# Patient Record
Sex: FEMALE | Race: OTHER
[De-identification: names, ages, dates, MRNs, and addresses within clinical notes are randomized per-mention and may not be internally consistent; named-entity substitution may affect disease eponyms.]

---

## 2021-04-11 ENCOUNTER — HOSPITAL ENCOUNTER (EMERGENCY)
Dept: HOSPITAL 61 - ER | Age: 61
Discharge: HOME | End: 2021-04-11
Payer: COMMERCIAL

## 2021-04-11 VITALS — HEIGHT: 62 IN | WEIGHT: 170.64 LBS | BODY MASS INDEX: 31.4 KG/M2

## 2021-04-11 VITALS — SYSTOLIC BLOOD PRESSURE: 118 MMHG | DIASTOLIC BLOOD PRESSURE: 70 MMHG

## 2021-04-11 DIAGNOSIS — I10: ICD-10-CM

## 2021-04-11 DIAGNOSIS — R10.32: ICD-10-CM

## 2021-04-11 DIAGNOSIS — Z98.890: ICD-10-CM

## 2021-04-11 DIAGNOSIS — M54.42: Primary | ICD-10-CM

## 2021-04-11 LAB
ALBUMIN SERPL-MCNC: 4.1 G/DL (ref 3.4–5)
ALBUMIN/GLOB SERPL: 1.1 {RATIO} (ref 1–1.7)
ALP SERPL-CCNC: 105 U/L (ref 46–116)
ALT SERPL-CCNC: 29 U/L (ref 14–59)
ANION GAP SERPL CALC-SCNC: 15 MMOL/L (ref 6–14)
APTT PPP: YELLOW S
AST SERPL-CCNC: 16 U/L (ref 15–37)
BACTERIA #/AREA URNS HPF: (no result) /HPF
BASOPHILS # BLD AUTO: 0 X10^3/UL (ref 0–0.2)
BASOPHILS NFR BLD: 1 % (ref 0–3)
BILIRUB SERPL-MCNC: 0.3 MG/DL (ref 0.2–1)
BILIRUB UR QL STRIP: NEGATIVE
BUN SERPL-MCNC: 13 MG/DL (ref 7–20)
BUN/CREAT SERPL: 19 (ref 6–20)
CALCIUM SERPL-MCNC: 9.1 MG/DL (ref 8.5–10.1)
CHLORIDE SERPL-SCNC: 100 MMOL/L (ref 98–107)
CO2 SERPL-SCNC: 24 MMOL/L (ref 21–32)
CREAT SERPL-MCNC: 0.7 MG/DL (ref 0.6–1)
EOSINOPHIL NFR BLD: 0.1 X10^3/UL (ref 0–0.7)
EOSINOPHIL NFR BLD: 1 % (ref 0–3)
ERYTHROCYTE [DISTWIDTH] IN BLOOD BY AUTOMATED COUNT: 12.4 % (ref 11.5–14.5)
FIBRINOGEN PPP-MCNC: CLEAR MG/DL
GFR SERPLBLD BASED ON 1.73 SQ M-ARVRAT: 85.6 ML/MIN
GLUCOSE SERPL-MCNC: 102 MG/DL (ref 70–99)
HCT VFR BLD CALC: 43.9 % (ref 36–47)
HGB BLD-MCNC: 15.2 G/DL (ref 12–15.5)
LIPASE: 130 U/L (ref 73–393)
LYMPHOCYTES # BLD: 2 X10^3/UL (ref 1–4.8)
LYMPHOCYTES NFR BLD AUTO: 31 % (ref 24–48)
MCH RBC QN AUTO: 32 PG (ref 25–35)
MCHC RBC AUTO-ENTMCNC: 35 G/DL (ref 31–37)
MCV RBC AUTO: 91 FL (ref 79–100)
MONO #: 0.4 X10^3/UL (ref 0–1.1)
MONOCYTES NFR BLD: 7 % (ref 0–9)
NEUT #: 3.8 X10^3/UL (ref 1.8–7.7)
NEUTROPHILS NFR BLD AUTO: 61 % (ref 31–73)
NITRITE UR QL STRIP: NEGATIVE
PH UR STRIP: 7 [PH]
PLATELET # BLD AUTO: 228 X10^3/UL (ref 140–400)
POTASSIUM SERPL-SCNC: 4.4 MMOL/L (ref 3.5–5.1)
PROT SERPL-MCNC: 7.8 G/DL (ref 6.4–8.2)
PROT UR STRIP-MCNC: NEGATIVE MG/DL
RBC # BLD AUTO: 4.81 X10^6/UL (ref 3.5–5.4)
RBC #/AREA URNS HPF: 0 /HPF (ref 0–2)
SODIUM SERPL-SCNC: 139 MMOL/L (ref 136–145)
UROBILINOGEN UR-MCNC: 0.2 MG/DL
WBC # BLD AUTO: 6.4 X10^3/UL (ref 4–11)
WBC #/AREA URNS HPF: (no result) /HPF (ref 0–4)

## 2021-04-11 PROCEDURE — 83690 ASSAY OF LIPASE: CPT

## 2021-04-11 PROCEDURE — 96361 HYDRATE IV INFUSION ADD-ON: CPT

## 2021-04-11 PROCEDURE — 85025 COMPLETE CBC W/AUTO DIFF WBC: CPT

## 2021-04-11 PROCEDURE — 99284 EMERGENCY DEPT VISIT MOD MDM: CPT

## 2021-04-11 PROCEDURE — 80053 COMPREHEN METABOLIC PANEL: CPT

## 2021-04-11 PROCEDURE — 36415 COLL VENOUS BLD VENIPUNCTURE: CPT

## 2021-04-11 PROCEDURE — 74176 CT ABD & PELVIS W/O CONTRAST: CPT

## 2021-04-11 PROCEDURE — 81001 URINALYSIS AUTO W/SCOPE: CPT

## 2021-04-11 PROCEDURE — 96374 THER/PROPH/DIAG INJ IV PUSH: CPT

## 2021-04-11 NOTE — PHYS DOC
Past Medical History


Past Medical History:  Hypertension


Past Surgical History:  


Smoking Status:  Never Smoker


Alcohol Use:  None


Drug Use:  None





General Adult


EDM:


Chief Complaint:  GROIN PAIN





HPI:


HPI:





Patient is a 60  year old female who presents with as of Tuesday she started 

having left lower groin sharp pain that was worse with sitting up and walking or

bending over.  She states she is also having slight left lower back pain.  

Patient at this time is laying flat and states that she has no pain.  She states

that she saw her doctor on Friday and they put her on Bactrim antibiotic for 

possible urinary tract infection.  She states that she does have a history of 

hypertension and kidney stones.  She denies fever, nausea,, chest pain, 

shortness of breath vomiting, diarrhea, constipation, abnormal vaginal discharge

or bleeding, focal weakness, numbness or tingling, loss of bowel bladder, 

headache, dizziness.  She states that she did start taking the Bactrim on 

Friday.





Review of Systems:


Review of Systems:


Constitutional:   Denies fever or chills. []


Eyes:   Denies change in visual acuity. []


HENT:   Denies nasal congestion or sore throat. [] 


Respiratory:   Denies cough or shortness of breath. [] 


Cardiovascular:   Denies chest pain or edema. [] 


GI:  + Left lower groin abdominal pain, denies nausea, vomiting, bloody stools 

or diarrhea. [] 


:  Denies dysuria. [] 


Musculoskeletal:   +Left lower back pain or denies joint pain. [] 


Integument:   Denies rash. [] 


Neurologic:   Denies headache, focal weakness or sensory changes. [] 


Endocrine:   Denies polyuria or polydipsia. [] 


Lymphatic:  Denies swollen glands. [] 


Psychiatric:  Denies depression or anxiety. []





Heart Score:


C/O Chest Pain:  No


Risk Factors:


Risk Factors:  DM, Current or recent (<one month) smoker, HTN, HLP, family 

history of CAD, obesity.


Risk Scores:


Score 0 - 3:  2.5% MACE over next 6 weeks - Discharge Home


Score 4 - 6:  20.3% MACE over next 6 weeks - Admit for Clinical Observation


Score 7 - 10:  72.7% MACE over next 6 weeks - Early Invasive Strategies





Allergies:


Allergies:





Allergies








Coded Allergies Type Severity Reaction Last Updated Verified


 


  No Known Drug Allergies    5/20/15 No











Physical Exam:


PE:





Constitutional: Well developed, well nourished, no acute distress, non-toxic 

appearance. []


HENT: Normocephalic, atraumatic, bilateral external ears normal, oropharynx 

moist, no oral exudates, nose normal. []


Eyes: PERRLA, EOMI, conjunctiva normal, no discharge. [] 


Neck: Normal range of motion, no tenderness, supple, no stridor. [] 


Cardiovascular:Heart rate regular rhythm, no murmur []


Lungs & Thorax:  Bilateral breath sounds clear to auscultation []


Abdomen: Bowel sounds normal, soft, no tenderness, no masses, no pulsatile 

masses. [] 


Skin: Warm, dry, no erythema, no rash. [] 


Back: No tenderness, no CVA tenderness. [] 


Extremities: No tenderness, no cyanosis, no clubbing, ROM intact, no edema. [] 


Neurologic: Alert and oriented X 3, normal motor function, normal sensory 

function, no focal deficits noted. []


Psychologic: Affect normal, judgement normal, mood normal.





**Normal physical exam []





EKG:


EKG:


[]





Radiology/Procedures:


Radiology/Procedures:


[]


Impression:


                            [facility]


                    [address]  [place], [place] [id number] [phone]


                                        


                                 IMAGING REPORT





                                     Signed





PATIENT: LIZABETH KENNEDYUNT: [id number]     MRN#: [id number]


: 1960           LOCATION: ER              AGE: 60


SEX: F                    EXAM DT: 21         ACCESSION#: [id number]


STATUS: REG ER            ORD. PHYSICIAN: SANTOSH SORTO


REASON: left flank and groin pain, history kidney stone


PROCEDURE: CT ABDOMEN PELVIS WO CONTRAST





Exam: CT abdomen/pelvis without intravenous contrast





Indication: Left flank and groin pain





Comparison: CT abdomen pelvis 2015





Technique: Helical CT imaging performed of the abdomen and pelvis without the 

use of intravenous contrast. Sagittal and coronal reformats were obtained. 





One or more of the following individualized dose reduction techniques were 

utilized for this examination:  





1. Automated exposure control


2. Adjustment of the mA and/or kV according to patient size


3. Use of iterative reconstruction technique.





Findings:





Inherently limited evaluation without intravenous contrast.





Lower chest: Normal.





Liver: Normal noncontrast appearance of the liver.





Gallbladder/Biliary Tree: Normal.





Pancreas: Normal.





Spleen: Normal.





Adrenal Glands: Normal.





Kidneys/Ureters/Bladder: Kidneys, ureters, and bladder are normal. No 

hydronephrosis or nephrolithiasis.





Reproductive Organs: Uterus is anteverted. 3 cm right adnexal cyst is unchanged.

 





Stomach, small bowel, and colon: The stomach and small bowel are normal. Colon 

is normal. Appendix is normal.





Vasculature: Abdominal aorta is normal in caliber. There is mild calcified 

atherosclerosis.





Lymph Nodes: No lymphadenopathy.





Peritoneum and retroperitoneum: No free fluid or free air.





Bones: No acute osseous abnormality. Moderate degenerative disc disease at L5-

S1.  





Impression:





1.  No acute abnormality. Specifically, no urolithiasis or hydronephrosis.





2.  Unchanged 3 cm right adnexal cyst.





Electronically signed by: Lupe Pryor MD (2021 1:04 PM) [id number]














DICTATED and SIGNED BY:     LUPE PRYOR MD


DATE:     21 [id number]





Course & Med Decision Making:


Course & Med Decision Making


Pertinent Labs and Imaging studies reviewed. (See chart for details)





See HPI.  Alert and oriented x4.  Ambulatory with a steady gait.  Speaks in full

 clear sentences.  No CVA tenderness.  Abdomen is soft and nontender.  Skin pink

 warm and dry.  Vital signs within normal limits.  Afebrile.





Blood work is unremarkable.  Urinalysis shows some contamination.  CT shows no 

acute findings and also shows a unchanged right ovarian cyst.  I think this is 

likely nerve pain that is stemming from her low back.  Patient to take ibuprofen

 to follow-up with her primary care provider.





[]





Dragon Disclaimer:


Dragon Disclaimer:


This electronic medical record was generated, in whole or in part, using a voice

 recognition dictation system.





Departure


Departure


Impression:  


   Primary Impression:  


   Back pain


   Qualified Codes:  M54.42 - Lumbago with sciatica, left side


   Additional Impression:  


   Groin pain


   Qualified Codes:  R10.32 - Left lower quadrant pain


Disposition:  01 DC HOME SELF CARE/HOMELESS


Condition:  STABLE


Referrals:  


TAYLOR MART MD (PCP)


Patient Instructions:  Back Pain, Adult, Sciatica with [facility]





Additional Instructions:  


Follow-up with primary care provider.  Use a heating pad to help with the back 

pain and into the groin area.  Rest.  Take ibuprofen for your pain.  You can 

continue the antibiotic.  If anything worsens you can always come back.  Drink 

plenty of fluids.


Scripts


Cyclobenzaprine Hcl (CYCLOBENZAPRINE HCL) 5 Mg Tablet


1 TAB PO TID PRN for PAIN, #21 TAB


   Prov: SANTOSH SORTO         21 


Ibuprofen (IBUPROFEN) 600 Mg Tablet


600 MG PO PRN Q6HRS PRN for INFLAMMATION, #25 TAB


   Prov: SANTOSH SORTO         21











SANTOSH SORTO            2021 12:25

## 2021-04-11 NOTE — RAD
Exam: CT abdomen/pelvis without intravenous contrast



Indication: Left flank and groin pain



Comparison: CT abdomen pelvis 5/20/2015



Technique: Helical CT imaging performed of the abdomen and pelvis without the use of intravenous cont
rast. Sagittal and coronal reformats were obtained. 



One or more of the following individualized dose reduction techniques were utilized for this examinat
ion:  



1. Automated exposure control

2. Adjustment of the mA and/or kV according to patient size

3. Use of iterative reconstruction technique.



Findings:



Inherently limited evaluation without intravenous contrast.



Lower chest: Normal.



Liver: Normal noncontrast appearance of the liver.



Gallbladder/Biliary Tree: Normal.



Pancreas: Normal.



Spleen: Normal.



Adrenal Glands: Normal.



Kidneys/Ureters/Bladder: Kidneys, ureters, and bladder are normal. No hydronephrosis or nephrolithias
is.



Reproductive Organs: Uterus is anteverted. 3 cm right adnexal cyst is unchanged. 



Stomach, small bowel, and colon: The stomach and small bowel are normal. Colon is normal. Appendix is
 normal.



Vasculature: Abdominal aorta is normal in caliber. There is mild calcified atherosclerosis.



Lymph Nodes: No lymphadenopathy.



Peritoneum and retroperitoneum: No free fluid or free air.



Bones: No acute osseous abnormality. Moderate degenerative disc disease at L5-S1.  



Impression:



1.  No acute abnormality. Specifically, no urolithiasis or hydronephrosis.



2.  Unchanged 3 cm right adnexal cyst.



Electronically signed by: Lupe Pryor MD (4/11/2021 1:04 PM) [id number]

## 2022-03-31 ENCOUNTER — HOSPITAL ENCOUNTER (EMERGENCY)
Dept: HOSPITAL 61 - ER | Age: 62
Discharge: HOME | End: 2022-03-31
Payer: COMMERCIAL

## 2022-03-31 VITALS — HEIGHT: 61 IN | BODY MASS INDEX: 32.13 KG/M2 | WEIGHT: 170.2 LBS

## 2022-03-31 VITALS — SYSTOLIC BLOOD PRESSURE: 136 MMHG | DIASTOLIC BLOOD PRESSURE: 80 MMHG
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
## 2022-03-31 DIAGNOSIS — S52.612A: ICD-10-CM
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
## 2022-03-31 DIAGNOSIS — S52.502A: Primary | ICD-10-CM
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
## 2022-03-31 DIAGNOSIS — I10: ICD-10-CM

## 2022-03-31 PROCEDURE — 73120 X-RAY EXAM OF HAND: CPT

## 2022-03-31 PROCEDURE — 99285 EMERGENCY DEPT VISIT HI MDM: CPT

## 2022-03-31 PROCEDURE — 99284 EMERGENCY DEPT VISIT MOD MDM: CPT

## 2022-03-31 PROCEDURE — 29125 APPL SHORT ARM SPLINT STATIC: CPT

## 2022-03-31 NOTE — RAD
XR LT WRIST 3VIEWS 3/31/2022 8:42 PM



INDICATION: Pain, fall



COMPARISON: None available.



TECHNIQUE:  3 views of the left wrist are provided.



FINDINGS/

IMPRESSION:



1. Comminuted fracture of the distal radial metadiaphysis with intra-articular extension to the radio
carpal joint. There is apex volar angulation.

2. There is a mildly displaced fracture of the ulnar styloid process.

3. Carpal bones are intact. Proximal metacarpals are intact.



Electronically signed by: Kristie Vargas MD (3/31/2022 9:05 PM) [email or web address]

## 2022-03-31 NOTE — PHYS DOC
Past Medical History


Past Medical History:  Hypertension


Past Surgical History:  


Smoking Status:  Never Smoker


Alcohol Use:  None


Drug Use:  None





General Adult


EDM:


Chief Complaint:  UPPER EXTREMITY PAIN





HPI:


HPI:





Patient is a 61  year old female past medical history hypertension presents with

a chief complaint of left wrist pain.  Prior to arrival patient was working in 

her yard when she slipped and fell on an outstretched left arm.


Patient denies hitting her head.  Patient complains of pain along her left 

wrist.





Review of Systems:


Review of Systems:


Constitutional:   Denies fever or chills. []


Eyes:   Denies change in visual acuity. []


HENT:   Denies nasal congestion or sore throat. [] 


Respiratory:   Denies cough or shortness of breath. [] 


Cardiovascular:   Denies chest pain or edema. [] 


GI:   Denies abdominal pain, nausea, vomiting, bloody stools or diarrhea. [] 


:  Denies dysuria. [] 


Musculoskeletal:   Denies back pain or joint pain. [Positive wrist pain] 


Integument:   Denies rash. [] 


Neurologic:   Denies headache, focal weakness or sensory changes. [] 


Endocrine:   Denies polyuria or polydipsia. [] 


Lymphatic:  Denies swollen glands. [] 


Psychiatric:  Denies depression or anxiety. []





Heart Score:


C/O Chest Pain:  N/A


Risk Factors:


Risk Factors:  DM, Current or recent (<one month) smoker, HTN, HLP, family 

history of CAD, obesity.


Risk Scores:


Score 0 - 3:  2.5% MACE over next 6 weeks - Discharge Home


Score 4 - 6:  20.3% MACE over next 6 weeks - Admit for Clinical Observation


Score 7 - 10:  72.7% MACE over next 6 weeks - Early Invasive Strategies





Allergies:


Allergies:





Allergies








Coded Allergies Type Severity Reaction Last Updated Verified


 


  No Known Drug Allergies    5/20/15 No











Physical Exam:


PE:





Constitutional: Well developed, well nourished, no acute distress, non-toxic 

appearance. []


HENT: Normocephalic, atraumatic, bilateral external ears normal, oropharynx 

moist, no oral exudates, nose normal. []


Eyes: PERRLA, EOMI, conjunctiva normal, no discharge. [] 


Neck: Normal range of motion, no tenderness, supple, no stridor. [] 


Cardiovascular:Heart rate regular rhythm, no murmur []


Lungs & Thorax:  Bilateral breath sounds clear to auscultation []


Abdomen: Bowel sounds normal, soft, no tenderness, no masses, no pulsatile 

masses. [] 


Skin: Warm, dry, no erythema, no rash. [] 


Back: No tenderness, no CVA tenderness. [] 


Extremities: No tenderness, no cyanosis, no clubbing, ROM intact, no edema. [] 


Neurologic: Alert and oriented X 3, normal motor function, normal sensory 

function, no focal deficits noted. []


Psychologic: Affect normal, judgement normal, mood normal. []





EKG:


EKG:


[]





Radiology/Procedures:


Radiology/Procedures:


[]


Impression:





1. Comminuted fracture of the distal radial metadiaphysis with intra-articular 

extension to the radiocarpal joint. There is apex volar angulation.


2. There is a mildly displaced fracture of the ulnar styloid process.


3. Carpal bones are intact. Proximal metacarpals are intact.





Course & Med Decision Making:


Course & Med Decision Making


Pertinent Labs and Imaging studies reviewed. (See chart for details)





[] Patient was evaluated for chief complaint.  Work-up consisted of radiologic 

imaging.  Results reviewed and discussed with patient.


Patient with a distal radius and ulnar fracture.


Patient treated with hydrocodone for pain.


Patient was placed in anterior posterior splint by nursing.  The splint was 

evaluated prior to discharge patient was neurovascularly intact post 

application.


Plan to discharge patient home with hydrocodone. 


Advised to take Tylenol and Motrin as needed with hydrocodone for breakthrough 

pain.


Advised rest ice elevate.


Discussed patient with orthopedics.


Patient to call Dr. Kassie Ro's office in the a.m. to schedule an appointment.





Dragon Disclaimer:


Dragon Disclaimer:


This electronic medical record was generated, in whole or in part, using a voice

 recognition dictation system.





Departure


Departure


Impression:  


   Primary Impression:  


   Wrist fracture, closed


Disposition:   HOME / SELF CARE / HOMELESS


Condition:  STABLE


Referrals:  


TAYLOR MART MD (PCP)








KASSIE RO MD


Patient Instructions:  Cast or Splint Care, Wrist Fracture


Scripts


Hydrocodone/Acetaminophen (Hydrocodone-Acetamin 5-325 mg) 1 Each Tablet


1 EACH PO Q4-6HRS, #20 TAB


   Prov: JERICA GRANT DO         3/31/22











JERICA GRANT DO            Mar 31, 2022 20:18